# Patient Record
Sex: FEMALE | Race: WHITE | NOT HISPANIC OR LATINO | Employment: UNEMPLOYED | URBAN - NONMETROPOLITAN AREA
[De-identification: names, ages, dates, MRNs, and addresses within clinical notes are randomized per-mention and may not be internally consistent; named-entity substitution may affect disease eponyms.]

---

## 2023-12-20 ENCOUNTER — HOSPITAL ENCOUNTER (OUTPATIENT)
Dept: RADIOLOGY | Facility: HOSPITAL | Age: 53
Discharge: HOME | End: 2023-12-20
Attending: NURSE PRACTITIONER

## 2023-12-20 DIAGNOSIS — Z82.41 FAMILY HISTORY OF SUDDEN CARDIAC DEATH (SCD): ICD-10-CM

## 2023-12-20 PROCEDURE — 75571 CT HRT W/O DYE W/CA TEST: CPT | Mod: TC

## 2024-05-31 ENCOUNTER — TELEPHONE (OUTPATIENT)
Dept: PAIN MEDICINE | Facility: CLINIC | Age: 54
End: 2024-05-31

## 2024-05-31 NOTE — TELEPHONE ENCOUNTER
Called pt, she has [employer]. Informed pt that Dr Beltre is not in network with her insurance. Pt verbalized understanding.

## 2024-05-31 NOTE — TELEPHONE ENCOUNTER
----- Message from Jada Smith sent at 5/31/2024  1:34 PM CDT -----  Contact: self  Type:  Patient Returning Call    Who Called:Gail Burger  Who Left Message for Patient:unsure  Does the patient know what this is regarding?:update on referral from [facility]/appt  Would the patient rather a call back or a response via [facility]?   Best Call Back Number:[phone]  Additional Information: n/a

## 2024-09-09 ENCOUNTER — TELEPHONE (OUTPATIENT)
Dept: PAIN MEDICINE | Facility: CLINIC | Age: 54
End: 2024-09-09

## 2024-09-09 NOTE — TELEPHONE ENCOUNTER
----- Message from Katie Trevizo sent at 9/9/2024  2:40 PM CDT -----  Contact: self  Patient is requesting a call back regarding scheduling - referral sent. Please call back at [phone]

## 2024-09-09 NOTE — TELEPHONE ENCOUNTER
Called pt, she reports that her doctor will be sending the referral today. I informed pt that we will have Dr Beltre review it once received and call to schedule after reviewed. Pt verbalized understanding.

## 2025-07-28 ENCOUNTER — PATIENT MESSAGE (OUTPATIENT)
Dept: ADMINISTRATIVE | Facility: OTHER | Age: 55
End: 2025-07-28